# Patient Record
Sex: MALE | Race: WHITE | NOT HISPANIC OR LATINO | Employment: OTHER | URBAN - METROPOLITAN AREA
[De-identification: names, ages, dates, MRNs, and addresses within clinical notes are randomized per-mention and may not be internally consistent; named-entity substitution may affect disease eponyms.]

---

## 2024-10-08 ENCOUNTER — APPOINTMENT (OUTPATIENT)
Dept: PRIMARY CARE | Facility: CLINIC | Age: 74
End: 2024-10-08
Payer: MEDICARE

## 2024-10-08 VITALS
WEIGHT: 187 LBS | DIASTOLIC BLOOD PRESSURE: 80 MMHG | HEIGHT: 70 IN | BODY MASS INDEX: 26.77 KG/M2 | OXYGEN SATURATION: 94 % | SYSTOLIC BLOOD PRESSURE: 128 MMHG | HEART RATE: 70 BPM

## 2024-10-08 DIAGNOSIS — Z00.00 ENCOUNTER FOR ANNUAL GENERAL MEDICAL EXAMINATION WITHOUT ABNORMAL FINDINGS IN ADULT: ICD-10-CM

## 2024-10-08 DIAGNOSIS — R73.09 ABNORMAL GLUCOSE: ICD-10-CM

## 2024-10-08 DIAGNOSIS — L30.9 DERMATITIS: ICD-10-CM

## 2024-10-08 DIAGNOSIS — B36.9 FUNGAL DERMATITIS: ICD-10-CM

## 2024-10-08 DIAGNOSIS — Z12.5 ENCOUNTER FOR SCREENING FOR MALIGNANT NEOPLASM OF PROSTATE: ICD-10-CM

## 2024-10-08 DIAGNOSIS — L29.9 PRURITUS, UNSPECIFIED: ICD-10-CM

## 2024-10-08 DIAGNOSIS — Z12.11 ENCOUNTER FOR SCREENING FOR MALIGNANT NEOPLASM OF COLON: ICD-10-CM

## 2024-10-08 DIAGNOSIS — E53.8 VITAMIN B 12 DEFICIENCY: ICD-10-CM

## 2024-10-08 DIAGNOSIS — I87.2 VENOUS STASIS DERMATITIS OF LEFT LOWER EXTREMITY: ICD-10-CM

## 2024-10-08 DIAGNOSIS — M79.89 SWELLING OF LEFT LOWER EXTREMITY: Primary | ICD-10-CM

## 2024-10-08 PROCEDURE — 1036F TOBACCO NON-USER: CPT | Performed by: FAMILY MEDICINE

## 2024-10-08 PROCEDURE — 1159F MED LIST DOCD IN RCRD: CPT | Performed by: FAMILY MEDICINE

## 2024-10-08 PROCEDURE — 99204 OFFICE O/P NEW MOD 45 MIN: CPT | Performed by: FAMILY MEDICINE

## 2024-10-08 PROCEDURE — 3008F BODY MASS INDEX DOCD: CPT | Performed by: FAMILY MEDICINE

## 2024-10-08 PROCEDURE — 1160F RVW MEDS BY RX/DR IN RCRD: CPT | Performed by: FAMILY MEDICINE

## 2024-10-08 ASSESSMENT — PATIENT HEALTH QUESTIONNAIRE - PHQ9
1. LITTLE INTEREST OR PLEASURE IN DOING THINGS: NOT AT ALL
2. FEELING DOWN, DEPRESSED OR HOPELESS: NOT AT ALL
10. IF YOU CHECKED OFF ANY PROBLEMS, HOW DIFFICULT HAVE THESE PROBLEMS MADE IT FOR YOU TO DO YOUR WORK, TAKE CARE OF THINGS AT HOME, OR GET ALONG WITH OTHER PEOPLE: NOT DIFFICULT AT ALL
SUM OF ALL RESPONSES TO PHQ9 QUESTIONS 1 AND 2: 0

## 2024-10-08 ASSESSMENT — ENCOUNTER SYMPTOMS
OCCASIONAL FEELINGS OF UNSTEADINESS: 0
LOSS OF SENSATION IN FEET: 0
DEPRESSION: 0

## 2024-10-08 NOTE — PROGRESS NOTES
"Subjective   Patient ID: Milind Rubio is a 74 y.o. male who presents for Establish Care (Patient is here to establish care. Patient has a rash all over the body. Having some swelling in the left leg and redness as well ).  Today he is accompanied by alone.     HPI  Has left leg swelling with red ness along the lower leg  Has been swollen for 5 months and is better   Was weeping and painful but now it is no longer hurting or drainage    Rash on the back about 1 - 2 months ago  Used hydrocortisone and it helped the itching   Review of Systems   All other systems reviewed and are negative.      Objective   /80   Pulse 70   Ht 1.784 m (5' 10.25\")   Wt 84.8 kg (187 lb)   SpO2 94%   BMI 26.64 kg/m²   BSA: 2.05 meters squared  Growth percentiles: Facility age limit for growth %jessi is 20 years. Facility age limit for growth %jessi is 20 years.     Physical Exam  Vitals and nursing note reviewed.   Constitutional:       General: He is not in acute distress.     Appearance: Normal appearance. He is normal weight. He is not ill-appearing.   HENT:      Head: Normocephalic.      Right Ear: Tympanic membrane, ear canal and external ear normal.      Left Ear: Tympanic membrane, ear canal and external ear normal.      Nose: Nose normal. No rhinorrhea.      Mouth/Throat:      Mouth: Mucous membranes are moist.      Pharynx: Oropharynx is clear.   Eyes:      Extraocular Movements: Extraocular movements intact.      Conjunctiva/sclera: Conjunctivae normal.      Pupils: Pupils are equal, round, and reactive to light.   Cardiovascular:      Rate and Rhythm: Normal rate and regular rhythm.      Pulses: Normal pulses.      Heart sounds: Normal heart sounds.   Pulmonary:      Effort: Pulmonary effort is normal. No respiratory distress.      Breath sounds: Normal breath sounds. No wheezing.   Musculoskeletal:         General: Swelling present. Normal range of motion.      Cervical back: Normal range of motion and neck supple. No " rigidity or tenderness.      Right lower leg: No edema.      Left lower leg: Edema present.   Lymphadenopathy:      Cervical: No cervical adenopathy.   Skin:     General: Skin is warm and dry.      Coloration: Skin is not ashen, cyanotic, jaundiced, mottled, pale or sallow.      Findings: Erythema and rash present. No abrasion, abscess, acne, bruising, burn, ecchymosis, signs of injury, laceration, lesion, petechiae or wound. Rash is macular, pustular, scaling and urticarial. Rash is not crusting, nodular, papular, purpuric or vesicular.      Nails: There is no clubbing.          Neurological:      General: No focal deficit present.      Mental Status: He is alert and oriented to person, place, and time.      Sensory: No sensory deficit.      Motor: No weakness.      Coordination: Coordination normal.   Psychiatric:         Mood and Affect: Mood normal.         Behavior: Behavior normal.         Thought Content: Thought content normal.         Judgment: Judgment normal.         Assessment/Plan   Problem List Items Addressed This Visit             ICD-10-CM    Fungal dermatitis B36.9    Relevant Medications    ketoconazole 1 % shampoo    Venous stasis dermatitis of left lower extremity I87.2    Relevant Medications    lactic acid-urea 10-40 % gel    Other Relevant Orders    TSH with reflex to Free T4 if abnormal     Other Visit Diagnoses         Codes    Swelling of left lower extremity    -  Primary M79.89    Relevant Orders    CBC and Auto Differential    Comprehensive Metabolic Panel    Magnesium    C-Reactive Protein    Sedimentation Rate    TSH with reflex to Free T4 if abnormal    Vitamin B12    Rheumatoid Factor    Encounter for annual general medical examination without abnormal findings in adult     Z00.00    Relevant Orders    CBC and Auto Differential    Comprehensive Metabolic Panel    Hepatitis C Antibody    Lipid Panel    Magnesium    C-Reactive Protein    Sedimentation Rate    Vitamin B12    Rheumatoid  Factor    Hemoglobin A1C    Prostate Specific Antigen, Screen    Vitamin B 12 deficiency     E53.8    Relevant Orders    Vitamin B12    Encounter for screening for malignant neoplasm of prostate     Z12.5    Relevant Orders    Prostate Specific Antigen, Screen    Abnormal glucose     R73.09    Relevant Orders    Hemoglobin A1C    Dermatitis     L30.9    Relevant Orders    TSH with reflex to Free T4 if abnormal    Pruritus, unspecified     L29.9    Relevant Orders    TSH with reflex to Free T4 if abnormal    Encounter for screening for malignant neoplasm of colon     Z12.11    Relevant Orders    Fecal Occult Blood Immunoassay          US of the lower left leg  Wrap it and have dressing daily  Call if worse or if no improvement   F/U in 1 month for leg swelling and rash or sooner if no improvement

## 2024-11-14 ENCOUNTER — CLINICAL SUPPORT (OUTPATIENT)
Dept: PRIMARY CARE | Facility: CLINIC | Age: 74
End: 2024-11-14
Payer: MEDICARE

## 2024-11-14 DIAGNOSIS — Z23 IMMUNIZATION DUE: ICD-10-CM

## 2024-11-14 PROCEDURE — G0008 ADMIN INFLUENZA VIRUS VAC: HCPCS | Performed by: FAMILY MEDICINE

## 2024-11-14 PROCEDURE — 90662 IIV NO PRSV INCREASED AG IM: CPT | Performed by: FAMILY MEDICINE

## 2024-11-14 NOTE — PROGRESS NOTES
Pt presents for annual HD flu vaccine per Dr Polanco. 0.5 mL given IM in L delt; no issues w/ injection. Pt tolerated well.

## 2025-04-24 ENCOUNTER — TELEPHONE (OUTPATIENT)
Dept: PRIMARY CARE | Facility: CLINIC | Age: 75
End: 2025-04-24
Payer: MEDICARE

## 2025-04-24 DIAGNOSIS — B36.9 FUNGAL DERMATITIS: ICD-10-CM
